# Patient Record
Sex: MALE | Race: BLACK OR AFRICAN AMERICAN | Employment: UNEMPLOYED | URBAN - METROPOLITAN AREA
[De-identification: names, ages, dates, MRNs, and addresses within clinical notes are randomized per-mention and may not be internally consistent; named-entity substitution may affect disease eponyms.]

---

## 2023-03-11 ENCOUNTER — APPOINTMENT (OUTPATIENT)
Dept: GENERAL RADIOLOGY | Age: 73
End: 2023-03-11
Payer: MEDICARE

## 2023-03-11 ENCOUNTER — HOSPITAL ENCOUNTER (EMERGENCY)
Age: 73
Discharge: HOME | End: 2023-03-11
Attending: EMERGENCY MEDICINE
Payer: MEDICARE

## 2023-03-11 VITALS
HEIGHT: 66 IN | TEMPERATURE: 98.8 F | RESPIRATION RATE: 16 BRPM | DIASTOLIC BLOOD PRESSURE: 85 MMHG | OXYGEN SATURATION: 98 % | HEART RATE: 76 BPM | BODY MASS INDEX: 30.05 KG/M2 | WEIGHT: 187 LBS | SYSTOLIC BLOOD PRESSURE: 151 MMHG

## 2023-03-11 DIAGNOSIS — M19.90 ARTHRITIS: ICD-10-CM

## 2023-03-11 DIAGNOSIS — M25.461 EFFUSION OF RIGHT KNEE: Primary | ICD-10-CM

## 2023-03-11 LAB
ABSOLUTE EOS #: <0.03 K/UL (ref 0–0.44)
ABSOLUTE IMMATURE GRANULOCYTE: 0.04 K/UL (ref 0–0.3)
ABSOLUTE LYMPH #: 2.63 K/UL (ref 1.1–3.7)
ABSOLUTE MONO #: 0.78 K/UL (ref 0.1–1.2)
ANION GAP SERPL CALCULATED.3IONS-SCNC: 12 MMOL/L (ref 9–17)
BASOPHILS # BLD: 0 % (ref 0–2)
BASOPHILS ABSOLUTE: <0.03 K/UL (ref 0–0.2)
BUN SERPL-MCNC: 27 MG/DL (ref 8–23)
BUN/CREAT BLD: 29 (ref 9–20)
CALCIUM SERPL-MCNC: 9.4 MG/DL (ref 8.6–10.4)
CHLORIDE SERPL-SCNC: 104 MMOL/L (ref 98–107)
CO2 SERPL-SCNC: 23 MMOL/L (ref 20–31)
CREAT SERPL-MCNC: 0.92 MG/DL (ref 0.7–1.2)
D DIMER BLD IA.RAPID-MCNC: 0.31 MG/L FEU (ref 0–0.59)
EOSINOPHILS RELATIVE PERCENT: 0 % (ref 1–4)
GFR SERPL CREATININE-BSD FRML MDRD: >60 ML/MIN/1.73M2
GLUCOSE SERPL-MCNC: 96 MG/DL (ref 70–99)
HCT VFR BLD AUTO: 37.8 % (ref 40.7–50.3)
HGB BLD-MCNC: 12.1 G/DL (ref 13–17)
IMMATURE GRANULOCYTES: 1 %
LYMPHOCYTES # BLD: 30 % (ref 24–43)
MCH RBC QN AUTO: 26.2 PG (ref 25.2–33.5)
MCHC RBC AUTO-ENTMCNC: 32 G/DL (ref 28.4–34.8)
MCV RBC AUTO: 81.8 FL (ref 82.6–102.9)
MONOCYTES # BLD: 9 % (ref 3–12)
NRBC AUTOMATED: 0 PER 100 WBC
PDW BLD-RTO: 15.5 % (ref 11.8–14.4)
PLATELET # BLD AUTO: 249 K/UL (ref 138–453)
PMV BLD AUTO: 9.4 FL (ref 8.1–13.5)
POTASSIUM SERPL-SCNC: 3.7 MMOL/L (ref 3.7–5.3)
RBC # BLD: 4.62 M/UL (ref 4.21–5.77)
RBC # BLD: ABNORMAL 10*6/UL
SEG NEUTROPHILS: 60 % (ref 36–65)
SEGMENTED NEUTROPHILS ABSOLUTE COUNT: 5.38 K/UL (ref 1.5–8.1)
SODIUM SERPL-SCNC: 139 MMOL/L (ref 135–144)
WBC # BLD AUTO: 8.9 K/UL (ref 3.5–11.3)

## 2023-03-11 PROCEDURE — 99284 EMERGENCY DEPT VISIT MOD MDM: CPT

## 2023-03-11 PROCEDURE — 36415 COLL VENOUS BLD VENIPUNCTURE: CPT

## 2023-03-11 PROCEDURE — 80048 BASIC METABOLIC PNL TOTAL CA: CPT

## 2023-03-11 PROCEDURE — 85379 FIBRIN DEGRADATION QUANT: CPT

## 2023-03-11 PROCEDURE — 85025 COMPLETE CBC W/AUTO DIFF WBC: CPT

## 2023-03-11 PROCEDURE — 6370000000 HC RX 637 (ALT 250 FOR IP): Performed by: EMERGENCY MEDICINE

## 2023-03-11 PROCEDURE — 73562 X-RAY EXAM OF KNEE 3: CPT

## 2023-03-11 RX ORDER — HYDROCODONE BITARTRATE AND ACETAMINOPHEN 5; 325 MG/1; MG/1
1 TABLET ORAL EVERY 4 HOURS PRN
Qty: 12 TABLET | Refills: 0 | Status: SHIPPED | OUTPATIENT
Start: 2023-03-11 | End: 2023-03-16

## 2023-03-11 RX ORDER — HYDROCODONE BITARTRATE AND ACETAMINOPHEN 5; 325 MG/1; MG/1
1 TABLET ORAL ONCE
Status: COMPLETED | OUTPATIENT
Start: 2023-03-11 | End: 2023-03-11

## 2023-03-11 RX ADMIN — HYDROCODONE BITARTRATE AND ACETAMINOPHEN 1 TABLET: 5; 325 TABLET ORAL at 19:02

## 2023-03-11 ASSESSMENT — ENCOUNTER SYMPTOMS
ABDOMINAL DISTENTION: 0
DIARRHEA: 0
SINUS PAIN: 0
COLOR CHANGE: 0
EYES NEGATIVE: 1
VOMITING: 0
SHORTNESS OF BREATH: 0
APNEA: 0
CONSTIPATION: 0
CHEST TIGHTNESS: 0

## 2023-03-11 ASSESSMENT — PAIN - FUNCTIONAL ASSESSMENT: PAIN_FUNCTIONAL_ASSESSMENT: 0-10

## 2023-03-11 ASSESSMENT — PAIN SCALES - GENERAL
PAINLEVEL_OUTOF10: 10
PAINLEVEL_OUTOF10: 9

## 2023-03-12 NOTE — ED PROVIDER NOTES
EMERGENCY DEPARTMENT ENCOUNTER    Pt Name: Edwin Perez  MRN: [id number]  [place] 1950  Date of evaluation: 3/11/23  CHIEF COMPLAINT       Chief Complaint   Patient presents with    Knee Pain     Pt here with right knee pain x3 weeks, been to UC twice and given prednisone for gout flare up, pt states not helping, pain is getting worse     HISTORY OF PRESENT ILLNESS   57-year-old male presents emergency room for 3-week history of right knee pain. Patient has had issues with knee pain in the past.  Previously whenever he had episodes he would be getting some steroids and the knee pain would improve rapidly. Pain has been ongoing and is not getting better. The knee is swollen and he has some swelling down into the calf as well. It is causing him a lot of discomfort and he is having trouble sleeping through the night. REVIEW OF SYSTEMS     Review of Systems   Constitutional:  Negative for activity change, chills and diaphoresis. HENT:  Negative for congestion, sinus pain and tinnitus. Eyes: Negative. Respiratory:  Negative for apnea, chest tightness and shortness of breath. Gastrointestinal:  Negative for abdominal distention, constipation, diarrhea and vomiting. Genitourinary:  Negative for difficulty urinating and frequency. Musculoskeletal:  Positive for arthralgias. Negative for myalgias. Skin:  Negative for color change and rash. Neurological:  Negative for dizziness. Hematological: Negative. Psychiatric/Behavioral: Negative. PASTMEDICAL HISTORY     Past Medical History:   Diagnosis Date    Acid reflux     Diabetes mellitus ([facility] [address]     Hyperlipidemia     Hypertension      Past Problem List  There is no problem list on file for this patient. SURGICAL HISTORY     History reviewed. No pertinent surgical history.   CURRENT MEDICATIONS       Previous Medications    AMLODIPINE (NORVASC) 5 MG TABLET    Take 5 mg by mouth daily    ATENOLOL (TENORMIN) 100 MG TABLET    Take 100 mg by mouth daily    ATORVASTATIN (LIPITOR) 80 MG TABLET    Take 80 mg by mouth daily 1/2 per day    FUROSEMIDE (LASIX) 20 MG TABLET    Take 1 tablet by mouth daily    INDOMETHACIN (INDOCIN) 25 MG CAPSULE    Take 1 capsule by mouth 3 times daily (with meals)    OMEPRAZOLE (PRILOSEC) 20 MG CAPSULE    Take 20 mg by mouth daily    POTASSIUM CHLORIDE (MICRO-K) 10 MEQ CR CAPSULE    Take 10 mEq by mouth 2 times daily     ALLERGIES     has No Known Allergies. FAMILY HISTORY     has no family status information on file. SOCIAL HISTORY       Social History     Tobacco Use    Smoking status: Former     Types: Cigarettes     Quit date: 1970     Years since quittin.6   Substance Use Topics    Alcohol use: No    Drug use: No     PHYSICAL EXAM     INITIAL VITALS: BP (!) 151/85   Pulse 76   Temp 98.8 °F (37.1 °C)   Resp 16   Ht 5' 6\" (1.676 m)   Wt 187 lb (84.8 kg)   SpO2 98%   BMI 30.18 kg/m²    Physical Exam  Constitutional:       General: He is not in acute distress. Appearance: He is well-developed. HENT:      Head: Normocephalic. Eyes:      Pupils: Pupils are equal, round, and reactive to light. Cardiovascular:      Rate and Rhythm: Normal rate and regular rhythm. Heart sounds: Normal heart sounds. Pulmonary:      Effort: Pulmonary effort is normal. No respiratory distress. Breath sounds: Normal breath sounds. Abdominal:      General: Bowel sounds are normal.      Palpations: Abdomen is soft. Tenderness: There is no abdominal tenderness. Musculoskeletal:      Right knee: Effusion present. No erythema. Decreased range of motion. Comments: Mild swelling to the right knee and right calf  There is no erythema or warmth to the joint   Skin:     General: Skin is warm and dry. Neurological:      Mental Status: He is alert and oriented to person, place, and time.        MEDICAL DECISION MAKING / ED COURSE:   Summary of Patient Presentation:        1)  Number and Complexity of Problems  Problem List This Visit: Knee pain with swelling to the knee and leg    Differential Diagnosis: Arthritis, effusion, DVT    Diagnoses Considered but Do Not Suspect: Extremely low clinical suspicion for septic joint given duration of pain and exam patient does not have any warmth to the joint      2)  Data Reviewed      Imaging that is independently reviewed and interpreted by me as: No acute bony process on x-ray of the knee    See more data below for the lab and radiology tests and orders. 3)  Treatment and Disposition    Well-appearing nondistressed 72-year-old male presented to emergency room with 3-week history of knee pain. I have extremely low clinical suspicion for septic joint patient has normal white count and vitals are unremarkable other than blood pressure being elevated. Patient does have joint effusion. Dimer is negative. We discussed need for orthopedic follow-up. Patient given short course of [place] for home. \"ED Course\" Notes From Epic Narrator:         CRITICAL CARE:       PROCEDURES:    Procedures      DATA FOR LAB AND RADIOLOGY TESTS ORDERED BELOW ARE REVIEWED BY THE ED CLINICIAN:    RADIOLOGY: All x-rays, CT, MRI, and formal ultrasound images (except ED bedside ultrasound) are read by the radiologist, see reports below, unless otherwise noted in MDM or here. Reports below are reviewed by myself. XR KNEE RIGHT (3 VIEWS)   Final Result   Moderate joint effusion. No erosions. No acute osseous abnormality. LABS: Lab orders shown below, the results are reviewed by myself, and all abnormals are listed below.   Labs Reviewed   CBC WITH AUTO DIFFERENTIAL - Abnormal; Notable for the following components:       Result Value    Hemoglobin 12.1 (*)     Hematocrit 37.8 (*)     MCV 81.8 (*)     RDW 15.5 (*)     Eosinophils % 0 (*)     Immature Granulocytes 1 (*)     All other components within normal limits   BASIC METABOLIC PANEL - Abnormal; Notable for the following components:    BUN 27 (*)     Bun/Cre Ratio 29 (*)     All other components within normal limits   D-DIMER, QUANTITATIVE       Vitals Reviewed:    Vitals:    03/11/23 1752   BP: (!) 151/85   Pulse: 76   Resp: 16   Temp: 98.8 °F (37.1 °C)   SpO2: 98%   Weight: 187 lb (84.8 kg)   Height: 5' 6\" (1.676 m)     MEDICATIONS GIVEN TO PATIENT THIS ENCOUNTER:  Orders Placed This Encounter   Medications    HYDROcodone-acetaminophen (NORCO) 5-325 MG per tablet 1 tablet    HYDROcodone-acetaminophen (NORCO) 5-325 MG per tablet     Sig: Take 1 tablet by mouth every 4 hours as needed for Pain for up to 5 days. Intended supply: 3 days. Take lowest dose possible to manage pain Max Daily Amount: 6 tablets     Dispense:  12 tablet     Refill:  0     DISCHARGE PRESCRIPTIONS:  New Prescriptions    HYDROCODONE-ACETAMINOPHEN (NORCO) 5-325 MG PER TABLET    Take 1 tablet by mouth every 4 hours as needed for Pain for up to 5 days. Intended supply: 3 days. Take lowest dose possible to manage pain Max Daily Amount: 6 tablets     PHYSICIAN CONSULTS ORDERED THIS ENCOUNTER:  None  FINAL IMPRESSION      1. Effusion of right knee    2.  Arthritis          DISPOSITION/PLAN   DISPOSITION Decision To Discharge 03/11/2023 08:04:18 PM      OUTPATIENT FOLLOW UP THE PATIENT:  Franc Gale MD  [place] [id number]  [phone]    Schedule an appointment as soon as possible for a visit in 1 week      MD Jim Barrera MD  03/11/23 2016

## 2023-03-12 NOTE — DISCHARGE INSTRUCTIONS
There is some additional fluid in the knee likely secondary to some arthritic changes. You can continue using the Motrin. Norco can help for more severe pain. Wrapping the knee will help. I recommend follow-up with orthopedics.

## 2023-03-15 ENCOUNTER — TELEPHONE (OUTPATIENT)
Dept: ORTHOPEDIC SURGERY | Age: 73
End: 2023-03-15

## 2023-03-15 NOTE — TELEPHONE ENCOUNTER
----- Message from Sahara Coleman MD sent at 3/13/2023  8:15 AM EDT -----  Hi,    Please schedule the patient for my clinic.      Sincerely,  Mima Bo MD  Orthopedic Surgery    ----- Message -----  From: Vale Gaspar MD  Sent: 3/11/2023   8:21 PM EDT  To: Sahara Coleman MD

## 2024-03-21 ENCOUNTER — TELEPHONE (OUTPATIENT)
Dept: GASTROENTEROLOGY | Age: 74
End: 2024-03-21

## 2024-03-21 NOTE — TELEPHONE ENCOUNTER
Writer called and patient hung up on me.  This is for colon screening. I am going to route it to the kaya .

## 2024-08-09 ENCOUNTER — TELEPHONE (OUTPATIENT)
Dept: GASTROENTEROLOGY | Age: 74
End: 2024-08-09

## 2024-08-09 NOTE — TELEPHONE ENCOUNTER
Patient called wanting to get his colonoscopy scheduled. He was originally scheduled in May with Dr. Barragan. and had to cancel due to being in hospital. Please call patient to schedule colonoscopy. Patient contact [phone].

## 2024-08-09 NOTE — TELEPHONE ENCOUNTER
Procedure schedule/Dr Lam  Procedure: colon   Dx: screening   Date: 10/23/2024  Time: 1:30 pm arrival 12:00 pm  Hospital: [facility] Phone call: n/a  Bowel Prep given: miralax/dulc   Advised in office/phone: phone   Clearance needed: none    Patient still has previous prescription that has not been used for the 05/02 time he was scheduled. Writer sent out new bowel prep instructions.

## 2024-10-23 ENCOUNTER — HOSPITAL ENCOUNTER (OUTPATIENT)
Age: 74
Setting detail: OUTPATIENT SURGERY
Discharge: HOME | End: 2024-10-23
Attending: INTERNAL MEDICINE | Admitting: INTERNAL MEDICINE
Payer: MEDICARE

## 2024-10-23 ENCOUNTER — ANESTHESIA EVENT (OUTPATIENT)
Dept: OPERATING ROOM | Age: 74
End: 2024-10-23
Payer: MEDICARE

## 2024-10-23 ENCOUNTER — ANESTHESIA (OUTPATIENT)
Dept: OPERATING ROOM | Age: 74
End: 2024-10-23
Payer: MEDICARE

## 2024-10-23 VITALS
OXYGEN SATURATION: 100 % | HEART RATE: 59 BPM | TEMPERATURE: 97.9 F | DIASTOLIC BLOOD PRESSURE: 79 MMHG | BODY MASS INDEX: 26.68 KG/M2 | HEIGHT: 66 IN | RESPIRATION RATE: 16 BRPM | WEIGHT: 166 LBS | SYSTOLIC BLOOD PRESSURE: 138 MMHG

## 2024-10-23 LAB — GLUCOSE BLD-MCNC: 115 MG/DL (ref 75–110)

## 2024-10-23 PROCEDURE — 3700000000 HC ANESTHESIA ATTENDED CARE: Performed by: INTERNAL MEDICINE

## 2024-10-23 PROCEDURE — 3609027000 HC COLONOSCOPY: Performed by: INTERNAL MEDICINE

## 2024-10-23 PROCEDURE — 7100000010 HC PHASE II RECOVERY - FIRST 15 MIN: Performed by: INTERNAL MEDICINE

## 2024-10-23 PROCEDURE — 6360000002 HC RX W HCPCS: Performed by: NURSE ANESTHETIST, CERTIFIED REGISTERED

## 2024-10-23 PROCEDURE — 2580000003 HC RX 258

## 2024-10-23 PROCEDURE — 3700000001 HC ADD 15 MINUTES (ANESTHESIA): Performed by: INTERNAL MEDICINE

## 2024-10-23 PROCEDURE — 2500000003 HC RX 250 WO HCPCS: Performed by: NURSE ANESTHETIST, CERTIFIED REGISTERED

## 2024-10-23 PROCEDURE — 2709999900 HC NON-CHARGEABLE SUPPLY: Performed by: INTERNAL MEDICINE

## 2024-10-23 PROCEDURE — G0121 COLON CA SCRN NOT HI RSK IND: HCPCS | Performed by: INTERNAL MEDICINE

## 2024-10-23 PROCEDURE — 82947 ASSAY GLUCOSE BLOOD QUANT: CPT

## 2024-10-23 PROCEDURE — 7100000011 HC PHASE II RECOVERY - ADDTL 15 MIN: Performed by: INTERNAL MEDICINE

## 2024-10-23 RX ORDER — SODIUM CHLORIDE 0.9 % (FLUSH) 0.9 %
5-40 SYRINGE (ML) INJECTION PRN
Status: DISCONTINUED | OUTPATIENT
Start: 2024-10-23 | End: 2024-10-23 | Stop reason: HOSPADM

## 2024-10-23 RX ORDER — SODIUM CHLORIDE, SODIUM LACTATE, POTASSIUM CHLORIDE, CALCIUM CHLORIDE 600; 310; 30; 20 MG/100ML; MG/100ML; MG/100ML; MG/100ML
INJECTION, SOLUTION INTRAVENOUS CONTINUOUS
Status: DISCONTINUED | OUTPATIENT
Start: 2024-10-23 | End: 2024-10-23 | Stop reason: HOSPADM

## 2024-10-23 RX ORDER — LIDOCAINE HYDROCHLORIDE 10 MG/ML
INJECTION, SOLUTION INFILTRATION; PERINEURAL
Status: DISCONTINUED | OUTPATIENT
Start: 2024-10-23 | End: 2024-10-23 | Stop reason: SDUPTHER

## 2024-10-23 RX ORDER — SODIUM CHLORIDE 0.9 % (FLUSH) 0.9 %
5-40 SYRINGE (ML) INJECTION EVERY 12 HOURS SCHEDULED
Status: DISCONTINUED | OUTPATIENT
Start: 2024-10-23 | End: 2024-10-23 | Stop reason: HOSPADM

## 2024-10-23 RX ORDER — SODIUM CHLORIDE 9 MG/ML
INJECTION, SOLUTION INTRAVENOUS CONTINUOUS
Status: DISCONTINUED | OUTPATIENT
Start: 2024-10-23 | End: 2024-10-23 | Stop reason: HOSPADM

## 2024-10-23 RX ORDER — SODIUM CHLORIDE 9 MG/ML
INJECTION, SOLUTION INTRAVENOUS PRN
Status: DISCONTINUED | OUTPATIENT
Start: 2024-10-23 | End: 2024-10-23 | Stop reason: HOSPADM

## 2024-10-23 RX ORDER — LIDOCAINE HYDROCHLORIDE 10 MG/ML
1 INJECTION, SOLUTION EPIDURAL; INFILTRATION; INTRACAUDAL; PERINEURAL
Status: DISCONTINUED | OUTPATIENT
Start: 2024-10-24 | End: 2024-10-23 | Stop reason: HOSPADM

## 2024-10-23 RX ORDER — DAPAGLIFLOZIN 5 MG/1
5 TABLET, FILM COATED ORAL EVERY MORNING
COMMUNITY

## 2024-10-23 RX ORDER — PROPOFOL 10 MG/ML
INJECTION, EMULSION INTRAVENOUS
Status: DISCONTINUED | OUTPATIENT
Start: 2024-10-23 | End: 2024-10-23 | Stop reason: SDUPTHER

## 2024-10-23 RX ADMIN — PROPOFOL 50 MG: 10 INJECTION, EMULSION INTRAVENOUS at 13:28

## 2024-10-23 RX ADMIN — PROPOFOL 50 MG: 10 INJECTION, EMULSION INTRAVENOUS at 13:38

## 2024-10-23 RX ADMIN — LIDOCAINE HYDROCHLORIDE 50 MG: 10 INJECTION, SOLUTION EPIDURAL; INFILTRATION; INTRACAUDAL; PERINEURAL at 13:28

## 2024-10-23 RX ADMIN — SODIUM CHLORIDE, POTASSIUM CHLORIDE, SODIUM LACTATE AND CALCIUM CHLORIDE: 600; 310; 30; 20 INJECTION, SOLUTION INTRAVENOUS at 12:28

## 2024-10-23 RX ADMIN — PROPOFOL 50 MG: 10 INJECTION, EMULSION INTRAVENOUS at 13:30

## 2024-10-23 RX ADMIN — PROPOFOL 50 MG: 10 INJECTION, EMULSION INTRAVENOUS at 13:33

## 2024-10-23 ASSESSMENT — PAIN - FUNCTIONAL ASSESSMENT: PAIN_FUNCTIONAL_ASSESSMENT: 0-10

## 2024-10-23 NOTE — ANESTHESIA PRE PROCEDURE
Department of Anesthesiology  Preprocedure Note       Name:  Portillo Gibson   Age:  73 y.o.  :  1950                                          MRN:  [id number]         Date:  10/23/2024      Surgeon: Surgeon(s):  Floresita Lam MD    Procedure: Procedure(s):  COLORECTAL CANCER SCREENING, NOT HIGH RISK    Medications prior to admission:   Prior to Admission medications    Medication Sig Start Date End Date Taking? Authorizing Provider   dapagliflozin (FARXIGA) 5 MG tablet Take 1 tablet by mouth every morning   Yes ProviderTasha MD   polyethylene glycol (GLYCOLAX) 17 GM/SCOOP powder Please follow instructions provided to you by your provider. 24  Yes Johanny Sharma APRN - NP   bisacodyl 5 MG EC tablet Please follow instructions given to you by your provider. 24  Yes Johanny Sharma APRN - NP   amLODIPine (NORVASC) 5 MG tablet Take 1 tablet by mouth daily   Yes ProviderTasha MD   atenolol (TENORMIN) 100 MG tablet Take 1 tablet by mouth daily   Yes ProviderTasha MD   omeprazole (PRILOSEC) 20 MG capsule Take 1 capsule by mouth daily   Yes ProviderTasha MD   potassium chloride (MICRO-K) 10 MEQ CR capsule Take 1 capsule by mouth 2 times daily   Yes ProviderTasha MD   atorvastatin (LIPITOR) 80 MG tablet Take 1 tablet by mouth daily 1/2 per day   Yes ProviderTasha MD   furosemide (LASIX) 20 MG tablet Take 1 tablet by mouth daily 16  Yes London Pelletier DO   indomethacin (INDOCIN) 25 MG capsule Take 1 capsule by mouth 3 times daily (with meals) 16  Yes London Pelletier DO       Current medications:    Current Facility-Administered Medications   Medication Dose Route Frequency Provider Last Rate Last Admin   • [START ON 10/24/2024] lidocaine PF 1 % injection 1 mL  1 mL IntraDERmal Once PRN Aadlberto Benavides DO       • 0.9 % sodium chloride infusion   IntraVENous Continuous Adalberto Benavides DO       • lactated ringers IV soln infusion SOLN

## 2024-10-23 NOTE — OP NOTE
PROCEDURE NOTE    DATE OF PROCEDURE: 10/23/2024    SURGEON: Floresita Lam MD  Facility : [facility]  ASSISTANT: None  Anesthesia: Monitored anesthesia care  PREOPERATIVE DIAGNOSIS: Screening for colon cancer (average risk)      POSTOPERATIVE DIAGNOSIS: as described below    OPERATION: Total colonoscopy     ANESTHESIA: Moderate Sedation    ESTIMATED BLOOD LOSS: less than 50     COMPLICATIONS: None.     SPECIMENS:  Was Not Obtained    HISTORY: The patient is a 73 y.o. year old male with history of above preop diagnosis.  I recommended colonoscopy with possible biopsy or polypectomy and I explained the risk, benefits, expected outcome, and alternatives to the procedure.  Risks included but are not limited to bleeding, infection, respiratory distress, hypotension, and perforation of the colon and possibility of missing a lesion.  The patient understands and is in agreement.        PROCEDURE: The patient was given IV conscious sedation.  The patient's SPO2 remained above 90% throughout the procedure.     Digital rectal exam- normal    The colonoscope was inserted per rectum and advanced under direct vision to the cecum without difficulty.      Post sedation note :The patient's SPO2 remained above 90% throughout the procedure.the vital signs remained stable , and no immediate complication form the procedure noted, patient will be ready for d/c when criteria is met .        The prep was excellent.      Findings:  Terminal ileum: normal    Cecum/Ascending colon: normal    Transverse colon: normal    Descending/Sigmoid colon: normal    Rectum/Anus: examined in normal and retroflexed positions and was normal    Withdrawal Time was (minutes): 6    Diverticulosis: pan-colonic     The colon was decompressed and the scope was removed.  The patient tolerated the procedure well.     Impression: Normal exam    Recommendations/Plan:   Lifestyle and dietary modifications as discussed  F/U In Office No  Repeat colonoscopy in 10

## 2024-10-23 NOTE — ANESTHESIA POSTPROCEDURE EVALUATION
Department of Anesthesiology  Postprocedure Note    Patient: Portillo Gibson  MRN: [id number]  YOB: 1950  Date of evaluation: 10/23/2024    Procedure Summary       Date: 10/23/24 Room / Location: [address]    Anesthesia Start: 1325 Anesthesia Stop: 1349    Procedure: COLORECTAL CANCER SCREENING, NOT HIGH RISK (Rectum) Diagnosis:       Screen for colon cancer      (Screen for colon cancer [Z12.11])    Surgeons: Floresita Lam MD Responsible Provider: Shawn Starr MD    Anesthesia Type: MAC ASA Status: 2            Anesthesia Type: No value filed.    Evi Phase I: Evi Score: 10    Evi Phase II: Evi Score: 9    Anesthesia Post Evaluation    Airway patency: patent  Cardiovascular status: hemodynamically stable  Respiratory status: acceptable    No notable events documented.

## 2024-10-23 NOTE — H&P
instructions provided to you by your provider. 4/24/24  Yes Johanny Sharma APRN - NP   bisacodyl 5 MG EC tablet Please follow instructions given to you by your provider. 4/24/24  Yes Johanny Sharma APRN - NP   amLODIPine (NORVASC) 5 MG tablet Take 1 tablet by mouth daily   Yes Tasha Norris MD   atenolol (TENORMIN) 100 MG tablet Take 1 tablet by mouth daily   Yes Tasha Norris MD   omeprazole (PRILOSEC) 20 MG capsule Take 1 capsule by mouth daily   Yes ProviderTasha MD   potassium chloride (MICRO-K) 10 MEQ CR capsule Take 1 capsule by mouth 2 times daily   Yes ProviderTasha MD   atorvastatin (LIPITOR) 80 MG tablet Take 1 tablet by mouth daily 1/2 per day   Yes Tasha Norris MD   furosemide (LASIX) 20 MG tablet Take 1 tablet by mouth daily 7/16/16  Yes London Pelletier DO   indomethacin (INDOCIN) 25 MG capsule Take 1 capsule by mouth 3 times daily (with meals) 7/16/16  Yes London Pelletier DO        Allergies:     Patient has no known allergies.    Social History:     Tobacco:    reports that he quit smoking about 54 years ago. His smoking use included cigarettes. He has never used smokeless tobacco.  Alcohol:      reports current alcohol use.  Drug Use:  reports no history of drug use.    Family History:     History reviewed. No pertinent family history.    Review of Systems:     Positive and Negative as described in HPI.    CONSTITUTIONAL: negative for fevers, chills, sweats, fatigue, weight loss  HEENT: Glasses negative for hearing changes, runny nose, throat pain  RESPIRATORY: SOB with exertion. Bilateral pneumonia April 2024 negative for cough, congestion, wheezing.  CARDIOVASCULAR: HTN HLD negative for chest pain, palpitations.  GASTROINTESTINAL: See HPI   GENITOURINARY: BPH- takes OTC supplements negative for difficulty of urination, burning with urination, frequency   INTEGUMENT: negative for rash, skin lesions, easy bruising   HEMATOLOGIC/LYMPHATIC:

## (undated) DEVICE — STAZ ENDO KIT: Brand: MEDLINE INDUSTRIES, INC.